# Patient Record
Sex: FEMALE | Race: WHITE | Employment: FULL TIME | URBAN - METROPOLITAN AREA
[De-identification: names, ages, dates, MRNs, and addresses within clinical notes are randomized per-mention and may not be internally consistent; named-entity substitution may affect disease eponyms.]

---

## 2018-04-25 PROBLEM — R41.82 ALTERED MENTAL STATUS: Status: ACTIVE | Noted: 2018-04-25

## 2018-04-26 PROBLEM — I63.9 CVA (CEREBRAL VASCULAR ACCIDENT) (HCC): Status: ACTIVE | Noted: 2018-04-26

## 2019-10-20 ENCOUNTER — HOSPITAL ENCOUNTER (EMERGENCY)
Age: 59
Discharge: HOME | End: 2019-10-21
Attending: EMERGENCY MEDICINE
Payer: COMMERCIAL

## 2019-10-20 ENCOUNTER — APPOINTMENT (OUTPATIENT)
Dept: GENERAL RADIOLOGY | Age: 59
End: 2019-10-20
Attending: EMERGENCY MEDICINE
Payer: COMMERCIAL

## 2019-10-20 VITALS
SYSTOLIC BLOOD PRESSURE: 137 MMHG | WEIGHT: 293 LBS | TEMPERATURE: 98.1 F | HEART RATE: 60 BPM | OXYGEN SATURATION: 97 % | BODY MASS INDEX: 50.02 KG/M2 | RESPIRATION RATE: 17 BRPM | DIASTOLIC BLOOD PRESSURE: 68 MMHG | HEIGHT: 64 IN

## 2019-10-20 DIAGNOSIS — M25.562 ACUTE PAIN OF LEFT KNEE: Primary | ICD-10-CM

## 2019-10-20 PROCEDURE — 85610 PROTHROMBIN TIME: CPT

## 2019-10-20 PROCEDURE — 99285 EMERGENCY DEPT VISIT HI MDM: CPT

## 2019-10-20 PROCEDURE — 74011250637 HC RX REV CODE- 250/637: Performed by: EMERGENCY MEDICINE

## 2019-10-20 PROCEDURE — 73562 X-RAY EXAM OF KNEE 3: CPT

## 2019-10-20 PROCEDURE — 73502 X-RAY EXAM HIP UNI 2-3 VIEWS: CPT

## 2019-10-20 RX ORDER — HYDROCODONE BITARTRATE AND ACETAMINOPHEN 5; 325 MG/1; MG/1
1 TABLET ORAL
Status: COMPLETED | OUTPATIENT
Start: 2019-10-20 | End: 2019-10-20

## 2019-10-20 RX ADMIN — HYDROCODONE BITARTRATE AND ACETAMINOPHEN 1 TABLET: 5; 325 TABLET ORAL at 23:47

## 2019-10-21 ENCOUNTER — APPOINTMENT (OUTPATIENT)
Dept: ULTRASOUND IMAGING | Age: 59
End: 2019-10-21
Attending: EMERGENCY MEDICINE
Payer: COMMERCIAL

## 2019-10-21 LAB — INR BLD: 1.1 (ref 0.9–1.2)

## 2019-10-21 PROCEDURE — 93971 EXTREMITY STUDY: CPT

## 2019-10-21 PROCEDURE — 74011250637 HC RX REV CODE- 250/637: Performed by: EMERGENCY MEDICINE

## 2019-10-21 RX ORDER — ASPIRIN 81 MG/1
81 TABLET ORAL DAILY
COMMUNITY

## 2019-10-21 RX ORDER — OXYCODONE AND ACETAMINOPHEN 5; 325 MG/1; MG/1
1 TABLET ORAL
Status: COMPLETED | OUTPATIENT
Start: 2019-10-21 | End: 2019-10-21

## 2019-10-21 RX ORDER — OXYCODONE AND ACETAMINOPHEN 5; 325 MG/1; MG/1
1 TABLET ORAL
Qty: 12 TAB | Refills: 0 | Status: SHIPPED | OUTPATIENT
Start: 2019-10-21 | End: 2019-10-24

## 2019-10-21 RX ORDER — ATORVASTATIN CALCIUM 20 MG/1
20 TABLET, FILM COATED ORAL DAILY
COMMUNITY

## 2019-10-21 RX ORDER — MELATONIN
1000 DAILY
COMMUNITY

## 2019-10-21 RX ORDER — WARFARIN 7.5 MG/1
7.5 TABLET ORAL DAILY
COMMUNITY

## 2019-10-21 RX ORDER — GABAPENTIN 300 MG/1
300 CAPSULE ORAL 3 TIMES DAILY
COMMUNITY

## 2019-10-21 RX ADMIN — OXYCODONE HYDROCHLORIDE AND ACETAMINOPHEN 1 TABLET: 5; 325 TABLET ORAL at 03:05

## 2019-10-21 NOTE — ED NOTES
Pt provided with discharge instructions. Pt verbalized understanding of discharge instructions and follow up information. Pt assisted out of ed in wheelchair to taxi ride home.

## 2019-10-21 NOTE — DISCHARGE INSTRUCTIONS

## 2019-10-21 NOTE — ED PROVIDER NOTES
EMERGENCY DEPARTMENT HISTORY AND PHYSICAL EXAM      Date: 10/20/2019  Patient Name: Bambi De Jesus    History of Presenting Illness     Chief Complaint   Patient presents with    Knee Pain     accidently falls on the 11th. xray negative. increasingly worse since. left lateral knee. 6.8 units of insulin at 2135 for diabetes type 2. History Provided By: Patient    HPI: Bambi De Jesus, 63 y.o. female with PMHx significant for atrial fibrillation on Coumadin, diabetes, hypertension, coronary artery disease, presents by EMS to the emergency room with chief complaint of left knee pain. Patient states that she fell 3 times in 1 week a few weeks ago. 1 of the episodes was while she was in the hospital visiting her  whose had a recent liver transplant. She presented to the emergency room at [facility] where she had x-rays done after the fall where she injured her left knee. She reports that x-rays were negative for fracture. She has not followed up with anyone regarding her knee pain which is gotten progressively worse over the past week. She reports that now she is having difficulty bearing weight or bending her knee. She reports that she is been taking Tylenol without relief. She denies any numbness, tingling of her left leg, but does report that her knee feels weak and gives out on her. She reports that she has sharp pains on the back of her knee, and some dull pain on the anterior and lateral aspects. Her pain is 6 out of 10 when she is lying still and 10 out of 10 whenever she tries to bear weight or bend her knee. Patient reports she has chronic asymmetric swelling of her left leg that is slightly worse than usual.  She also complains of some mild dizziness which is a lightheaded feeling and some sinus drainage and congestion. PCP: Leatha, Not On File    No current facility-administered medications on file prior to encounter.       Current Outpatient Medications on File Prior to Encounter Medication Sig Dispense Refill    ciprofloxacin-dexamethasone (CIPRODEX) 0.3-0.1 % otic suspension Administer 4 Drops in right ear two (2) times a day. 10 mL 0    predniSONE (DELTASONE) 10 mg tablet Take 4 tabs po daily for 1 days, then 3 tabs daily for 1 days, then 2 tabs daily for 1 days, then 1 tab po daily for 1 days 10 Tab 0    apixaban (ELIQUIS) 5 mg tablet Take 1 Tab by mouth two (2) times a day. 60 Tab 0    aspirin delayed-release 81 mg tablet Take 1 Tab by mouth daily. 30 Tab 0    baclofen (LIORESAL) 10 mg tablet Take 10 mg by mouth three (3) times daily.  FLUoxetine (PROZAC) 20 mg capsule Take 20 mg by mouth daily.  benazepril (LOTENSIN) 10 mg tablet Take 10 mg by mouth daily.  simvastatin (ZOCOR) 20 mg tablet Take 20 mg by mouth nightly.  insulin CONCENTRATED regular (HUMULIN R) 500 unit/mL soln 30 Units by SubCUTAneous route Before breakfast and dinner.  metoprolol (LOPRESSOR) 100 mg tablet Take  by mouth two (2) times a day.  exenatide microspheres (BYDUREON) 2 mg serr 2 mg by SubCUTAneous route every seven (7) days.  topiramate (TOPAMAX) 100 mg tablet Take 200 mg by mouth two (2) times a day.  levothyroxine (SYNTHROID) 75 mcg tablet Take  by mouth Daily (before breakfast).  omeprazole (PRILOSEC) 20 mg capsule Take 20 mg by mouth daily. Past History     Past Medical History:  Past Medical History:   Diagnosis Date    Arthritis     [facility]     Diabetes [facility]     Essential hypertension     Heart attack ([facility]     HPV in female     Neuropathy     Uterine cancer ([facility] [address]        Past Surgical History:  Past Surgical History:   Procedure Laterality Date    HX HYSTERECTOMY         Family History:  No family history on file. Social History:  Social History     Tobacco Use    Smoking status: Former Smoker   Substance Use Topics    Alcohol use: No    Drug use: No     Comment: hx of thc       Allergies:   Allergies   Allergen Reactions    Amoxicillin Rash    Clarinex [Desloratadine] Rash    Flonase [Fluticasone] Other (comments)     \"hyper\"    Lyrica [Pregabalin] Other (comments)     \"muscle cramps, forgetfulness\"    Nasacort [Triamcinolone Acetonide] Rash    Neurontin [Gabapentin] Other (comments)     \"hallucinate\"    Trileptal [Oxcarbazepine] Other (comments)     \"Elevated blood sugar\"    Zyrtec [Cetirizine] Other (comments)     \"leg cramps\"         Review of Systems   Review of Systems   Constitutional: Negative for chills and fever. HENT: Positive for congestion, rhinorrhea and sinus pressure. Negative for ear pain, sore throat and trouble swallowing. Eyes: Negative. Respiratory: Negative for cough, chest tightness and wheezing. Cardiovascular: Negative for chest pain and palpitations. Gastrointestinal: Negative for abdominal pain, diarrhea, nausea and vomiting. Genitourinary: Negative for dysuria, flank pain and hematuria. Musculoskeletal: Positive for gait problem, joint swelling and myalgias. Negative for back pain. Skin: Negative for rash and wound. Neurological: Positive for light-headedness. Negative for dizziness, syncope and headaches. Psychiatric/Behavioral: Negative for confusion. The patient is not nervous/anxious.           Physical Exam   General appearance -overweight, well appearing, and in no distress  Eyes - pupils equal and reactive, extraocular eye movements intact  ENT - mucous membranes moist, pharynx normal without lesions  Neck - supple, no significant adenopathy; non-tender to palpation  Chest - clear to auscultation, no wheezes, rales or rhonchi; non-tender to palpation  Heart - normal rate and regular rhythm, S1 and S2 normal, no murmurs noted  Abdomen - soft, nontender, nondistended, no masses or organomegaly  Musculoskeletal -tender to palpation left knee without erythema or warmth, no deformity mild swelling; decreased range of motion due to pain, increased pain with posterior drawer sign, tenderness posterior knee and posterior lower thigh  Extremities - peripheral pulses normal, no pedal edema, mild left lower leg swelling  Skin - normal coloration and turgor, no rashes  Neurological - alert, oriented x3, normal speech, no focal findings or movement disorder noted    Diagnostic Study Results     Labs -     Recent Results (from the past 12 hour(s))   POC INR    Collection Time: 10/20/19 11:52 PM   Result Value Ref Range    INR (POC) 1.1 <1.2         Radiologic Studies -   XR KNEE LT 3 V   Final Result   IMPRESSION: No acute abnormality. XR HIP LT W OR WO PELV 2-3 VWS   Final Result   IMPRESSION: No acute abnormality. CT Results  (Last 48 hours)    None        CXR Results  (Last 48 hours)    None            Medical Decision Making   I am the first provider for this patient. I reviewed the vital signs, available nursing notes, past medical history, past surgical history, family history and social history. Vital Signs-Reviewed the patient's vital signs. Patient Vitals for the past 12 hrs:   Temp Pulse Resp BP SpO2   10/20/19 2315    137/68 97 %   10/20/19 2300    132/55 97 %   10/20/19 2245    (!) 125/97 97 %   10/20/19 2230    130/62 100 %   10/20/19 2215    129/62 98 %   10/20/19 2208 98.1 °F (36.7 °C) 60 17 133/54 98 %           Records Reviewed: Nursing Notes and Old Medical Records    Provider Notes (Medical Decision Making):   Differential diagnosis: Baker's cyst, fracture, sprain, ligamentous injury, meniscal injury    ED Course:   Initial assessment performed. The patients presenting problems have been discussed, and they are in agreement with the care plan formulated and outlined with them. I have encouraged them to ask questions as they arise throughout their visit. Progress Notes:     Patient with moderate pain relief after meds in the ED. Knee immobilizer placed. Will encourage close follow-up with orthopedics.   Patient unsteady on crutches. She has a walker at her house, but belongs to her , will prescribe a walker for her. Disposition:  Discharge home    PLAN:  1. Current Discharge Medication List      START taking these medications    Details   oxyCODONE-acetaminophen (PERCOCET) 5-325 mg per tablet Take 1 Tab by mouth every six (6) hours as needed for Pain for up to 3 days. Max Daily Amount: 4 Tabs. Qty: 12 Tab, Refills: 0    Associated Diagnoses: Acute pain of left knee           2. Follow-up Information     Follow up With Specialties Details Why [address], [address] [facility], DO Orthopedic Surgery Schedule an appointment as soon as possible for a visit  [address] [place] [address]  [phone]      [facility] EMERGENCY DEPT Emergency Medicine  If symptoms worsen [address] [facility]  [address] [facility]  [phone]        Return to ED if worse     Diagnosis     Clinical Impression:   1.  Acute pain of left knee